# Patient Record
Sex: FEMALE | Race: WHITE | URBAN - METROPOLITAN AREA
[De-identification: names, ages, dates, MRNs, and addresses within clinical notes are randomized per-mention and may not be internally consistent; named-entity substitution may affect disease eponyms.]

---

## 2019-07-09 ENCOUNTER — OFFICE VISIT (OUTPATIENT)
Dept: OBGYN CLINIC | Age: 24
End: 2019-07-09

## 2019-07-09 ENCOUNTER — HOSPITAL ENCOUNTER (OUTPATIENT)
Dept: LAB | Age: 24
Discharge: HOME | End: 2019-07-09
Payer: COMMERCIAL

## 2019-07-09 VITALS
WEIGHT: 189.2 LBS | HEIGHT: 65 IN | DIASTOLIC BLOOD PRESSURE: 80 MMHG | SYSTOLIC BLOOD PRESSURE: 114 MMHG | HEART RATE: 96 BPM | BODY MASS INDEX: 31.52 KG/M2 | RESPIRATION RATE: 16 BRPM

## 2019-07-09 DIAGNOSIS — Z11.3 SCREENING EXAMINATION FOR STD (SEXUALLY TRANSMITTED DISEASE): ICD-10-CM

## 2019-07-09 DIAGNOSIS — Z01.419 WELL WOMAN EXAM WITH ROUTINE GYNECOLOGICAL EXAM: Primary | ICD-10-CM

## 2019-07-09 PROBLEM — Z86.19 HISTORY OF INFECTION DUE TO HUMAN PAPILLOMA VIRUS (HPV): Status: ACTIVE | Noted: 2019-07-09

## 2019-07-09 PROBLEM — Z72.0 CURRENT OCCASIONAL SMOKER: Status: ACTIVE | Noted: 2019-07-09

## 2019-07-09 PROCEDURE — 88175 CYTOPATH C/V AUTO FLUID REDO: CPT

## 2019-07-09 NOTE — PROGRESS NOTES
Chief Complaint   Patient presents with    Well Woman     Pt presents for annual exam, no c/o voiced. Pt wants to be checked for STD's.    1. Have you been to the ER, urgent care clinic since your last visit? Hospitalized since your last visit? No    2. Have you seen or consulted any other health care providers outside of the [address] [place] [address] since your last visit? Include any pap smears or colon screening.  No     3 most recent PHQ Screens 7/9/2019   Little interest or pleasure in doing things Not at all   Feeling down, depressed, irritable, or hopeless Not at all   Total Score PHQ 2 0

## 2019-07-09 NOTE — PATIENT INSTRUCTIONS
Pap Test: Care Instructions  Your Care Instructions    The Pap test (also called a Pap smear) is a screening test for cancer of the cervix, which is the lower part of the uterus that opens into the vagina. The test can help your doctor find early changes in the cells that could lead to cancer. The sample of cells taken during your test has been sent to a lab so that an expert can look at the cells. It usually takes a week or two to get the results back. Follow-up care is a key part of your treatment and safety. Be sure to make and go to all appointments, and call your doctor if you are having problems. It's also a good idea to know your test results and keep a list of the medicines you take. What do the results mean? · A normal result means that the test did not find any abnormal cells in the sample. · An abnormal result can mean many things. Most of these are not cancer. The results of your test may be abnormal because:  ? You have an infection of the vagina or cervix, such as a yeast infection. ? You have an IUD (intrauterine device for birth control). ? You have low estrogen levels after menopause that are causing the cells to change. ? You have cell changes that may be a sign of precancer or cancer. The results are ranked based on how serious the changes might be. There are many other reasons why you might not get a normal result. If the results were abnormal, you may need to get another test within a few weeks or months. If the results show changes that could be a sign of cancer, you may need a test called a colposcopy, which provides a more complete view of the cervix. Sometimes the lab cannot use the sample because it does not contain enough cells or was not preserved well. If so, you may need to have the test again. This is not common, but it does happen from time to time. When should you call for help?   Watch closely for changes in your health, and be sure to contact your doctor if:    · You have vaginal bleeding or pain for more than 2 days after the test. It is normal to have a small amount of bleeding for a day or two after the test.   Where can you learn more? Go to http://bri-faby.info/. Enter O639 in the search box to learn more about \"Pap Test: Care Instructions. \"  Current as of: March 27, 2018  Content Version: 11.9  © 2006-2018 [employer]. Care instructions adapted under license by [employer] (which disclaims liability or warranty for this information). If you have questions about a medical condition or this instruction, always ask your healthcare professional. [address] any warranty or liability for your use of this information. Learning About Birth Control: The Patch  What is the patch? The patch is used to prevent pregnancy. It looks like a bandage and is put on the skin of your belly, rear end (buttocks), upper arm, or upper body (but not on a breast). The patch releases a regular dose of the hormones estrogen and progestin. These hormones prevent pregnancy in three ways. They thicken the mucus in the cervix. This makes it hard for sperm to travel into the uterus. They thin the lining of the uterus, which makes it harder for a fertilized egg to attach to the uterus. The hormones also can stop the ovaries from releasing an egg each month (ovulation). The patch provides birth control for 1 month at a time. You change the patch once a week for 3 weeks and then go without a patch for 1 week. During this week, you have your period. Your period may be very light. How well does it work? In the first year of use:  · When the patch is used exactly as directed, fewer than 1 woman out of 100 has an unplanned pregnancy. · When the patch is not used exactly as directed, 9 women out of 100 have an unplanned pregnancy. Be sure to tell your doctor about any health problems you have or medicines you take.  He or she can help you choose the birth control method that is right for you. What are the advantages of the patch? · The patch is more effective for preventing pregnancy than barrier methods of birth control, such as the condom or diaphragm. · It may reduce acne, heavy bleeding and cramping, and symptoms of premenstrual syndrome. · It's convenient. You put it on only 3 times each month. You do not have to interrupt sex to protect against pregnancy. · It's easy to check to see if you forgot to put one on. What are the disadvantages of the patch? · The patch doesn't protect against sexually transmitted infections (STIs), such as herpes or HIV/AIDS. If you aren't sure if your sex partner might have an STI, use a condom to protect against disease. · The patch may cause changes in your period. You may have little bleeding, skipped periods, or spotting. · It may cause mood changes, less interest in sex, or weight gain. · The patch contains estrogen. It may not be right for you if you have certain health problems or concerns. · It may increase your risk of blood clots. · It may be less effective in women who are overweight. · You must remember to change the patch on schedule. Where can you learn more? Go to http://bri-faby.info/. Enter S171 in the search box to learn more about \"Learning About Birth Control: The Patch. \"  Current as of: September 5, 2018  Content Version: 11.9  © 5193-4550 [employer]. Care instructions adapted under license by [employer] (which disclaims liability or warranty for this information). If you have questions about a medical condition or this instruction, always ask your healthcare professional. [place] [address] any warranty or liability for your use of this information.

## 2019-07-09 NOTE — PROGRESS NOTES
NEW PATIENT EXAM  Young Adult Woman    SUBJECTIVE: Jermaine Grant is a 21 y.o. female G1 Ab1 who presents for well woman exam with desire for STD testing and contraceptive counseling. Pt. Has been taking the OCP's without difficulty, though she admits she has trouble remembering them. She is interested in other methods. Pt. Admits to light, daily smoking --a few cigarettes per day. Pt. Is not in a monogamous relationship and desires screening for STD's. Pt. Does report a pap test taken about 2 -3 years ago and was told that she tested + for HPV at that time. Patient's last menstrual period was 2019. GYN History  Menarche: as teen  Contraception:  OCP (Oral Contraceptive Pills)  Sexual History:  unremarkable  Sexually transmitted diseases/infections: yes- HPV  Last pap: 2016  The prior Pap result: states had HPV      History reviewed. No pertinent past medical history. Past Surgical History:   Procedure Laterality Date    HX ORTHOPAEDIC      Right knee reconstruction     OB History        1    Para        Term                AB   1    Living   0       SAB        TAB        Ectopic        Molar        Multiple        Live Births              Obstetric Comments   VIP x 1           History reviewed. No pertinent family history. Social History     Socioeconomic History    Marital status: SINGLE     Spouse name: Not on file    Number of children: Not on file    Years of education: Not on file    Highest education level: Not on file   Occupational History    Not on file   Social Needs    Financial resource strain: Not on file    Food insecurity:     Worry: Not on file     Inability: Not on file    Transportation needs:     Medical: Not on file     Non-medical: Not on file   Tobacco Use    Smoking status: Current Some Day Smoker     Packs/day: 0.25     Years: 5.00     Pack years: 1.25    Smokeless tobacco: Never Used   Substance and Sexual Activity    Alcohol use:  Yes    Drug use: Never    Sexual activity: Yes     Partners: Male     Birth control/protection: None   Lifestyle    Physical activity:     Days per week: Not on file     Minutes per session: Not on file    Stress: Not on file   Relationships    Social connections:     Talks on phone: Not on file     Gets together: Not on file     Attends L[employer] day service: Not on file     Active member of club or organization: Not on file     Attends meetings of clubs or organizations: Not on file     Relationship status: Not on file    Intimate partner violence:     Fear of current or ex partner: Not on file     Emotionally abused: Not on file     Physically abused: Not on file     Forced sexual activity: Not on file   Other Topics Concern    Not on file   Social History Narrative    Not on file       Current Outpatient Medications   Medication Sig Dispense Refill    [START ON 7/13/2019] norelgestromin-ethinyl estradiol (ORTHO EVRA) 150-35 mcg/24 hr 1 Patch by TransDERmal route Every Saturday. 3 Patch 12         Review of Systems:   Complete review of systems reviewed from social and history data forms. Pertinent positives in HPI. Objective:     Visit Vitals  /80 (BP 1 Location: Right arm, BP Patient Position: Sitting)   Pulse 96   Resp 16   Ht 5' 5\" (1.651 m)   Wt 189 lb 3.2 oz (85.8 kg)   LMP 06/18/2019   BMI 31.48 kg/m²       General:  alert, cooperative, no distress, appears stated age   Skin:  Normal.   Lymph Nodes:  Cervical, supraclavicular, and axillary nodes normal.   Breast Exam: normal appearance, no masses or tenderness    Lungs:  clear to auscultation bilaterally   Heart:  regular rate and rhythm, S1, S2 normal, no murmur, click, rub or gallop   Abdomen: soft, non-tender.  Bowel sounds normal. No masses,  no organomegaly   Back:  Costovertebral angle tenderness absent   Genitourinary: BUS normal. Introitus normal. Normal appearing vaginal epithelium, Vaginal discharge described as normal and physiologic., Normal cervix without lesions or tenderness, Uterus normal size anteverted. NT., Adnexa normal in size left and right without tenderness. Extremities:  extremities normal, atraumatic, no cyanosis or edema         ASSESSMENT:      ICD-10-CM ICD-9-CM    1. Well woman exam with routine gynecological exam Z01.419 V72.31 PAP IG, RFX HPV ASCU, 96&13,25([id number])   2. Screening examination for STD (sexually transmitted disease) Z11.3 V74.5 CHLAMYDIA/GC PCR      Plan:  Rx. For Ortho Evra, #3, 12 refills given. Begin patch with next menses. GC/CT taken. Pap taken. Encourage smoking cessation. RTO 1 year. Contraceptive counseling: done    Pt. Voices understanding of treatment plan.   Follow-up and Dispositions    · Return in about 1 year (around 7/9/2020) for annual.         Nolon Lennox, NP

## 2019-07-11 LAB
C TRACH RRNA SPEC QL NAA+PROBE: NEGATIVE
N GONORRHOEA RRNA SPEC QL NAA+PROBE: NEGATIVE

## 2022-03-18 PROBLEM — Z86.19 HISTORY OF INFECTION DUE TO HUMAN PAPILLOMA VIRUS (HPV): Status: ACTIVE | Noted: 2019-07-09

## 2022-03-20 PROBLEM — Z72.0 CURRENT OCCASIONAL SMOKER: Status: ACTIVE | Noted: 2019-07-09
